# Patient Record
Sex: FEMALE | Race: WHITE
[De-identification: names, ages, dates, MRNs, and addresses within clinical notes are randomized per-mention and may not be internally consistent; named-entity substitution may affect disease eponyms.]

---

## 2017-04-15 ENCOUNTER — HOSPITAL ENCOUNTER (EMERGENCY)
Dept: HOSPITAL 25 - ED | Age: 62
Discharge: HOME | End: 2017-04-15
Payer: COMMERCIAL

## 2017-04-15 VITALS — DIASTOLIC BLOOD PRESSURE: 57 MMHG | SYSTOLIC BLOOD PRESSURE: 102 MMHG

## 2017-04-15 DIAGNOSIS — S92.911A: Primary | ICD-10-CM
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
## 2017-04-15 PROCEDURE — 99282 EMERGENCY DEPT VISIT SF MDM: CPT

## 2017-04-15 NOTE — ED
Lower Extremity





- HPI Summary


HPI Summary: 


Patient was at home walking around in socks when she accidentally kicked a 

heating grate in her house with her right small toe. She had immediate pain and 

presents worried she has broken her small toe. She has not taken any pain 

medication and has not previously injured this toe.





- History of Current Complaint


Chief Complaint: EDExtremityLower


Stated Complaint: HURT TOE RIGHT FOOT


Time Seen by Provider: 04/15/17 17:30


Hx Obtained From: Patient


Mechanism Of Injury: Blunt Trauma


Onset of Pain: Immediate


Onset/Duration: Hours


Severity Initially: Mild


Severity Currently: Mild


Pain Intensity: 3


Timing: Constant


Location: Is Discrete @ - right small toe


Character Of Pain: Sharp, Aching


Associated Signs And Symptoms: Positive: Swelling


Aggravating Factor(s): Movement


Alleviating Factor(s): Nothing


Able to Bear Weight: Yes





- Allergies/Home Medications


Allergies/Adverse Reactions: 


 Allergies











Allergy/AdvReac Type Severity Reaction Status Date / Time


 


Nefazodone [From Serzone] Allergy  Headache Verified 04/07/17 11:05


 


Prochlorperazine Allergy  See Comment Verified 04/07/17 11:05





[From Compazine]     


 


Sulfamethoxazole Allergy  Rash Verified 04/07/17 11:05





w/Trimethoprim     





[From Septra]     


 


nefrazodone Allergy Unknown Unknown Uncoded 04/07/17 11:05





   Reaction  





   Details  














PMH/Surg Hx/FS Hx/Imm Hx


Endocrine/Hematology History: Reports: Hx Thyroid Disease


   Denies: Hx Anticoagulant Therapy, Hx Diabetes


Cardiovascular History: 


   Denies: Hx Hypertension, Hx Pacemaker/ICD


Respiratory History: 


   Denies: Hx Asthma, Hx Chronic Obstructive Pulmonary Disease (COPD)


 History: 


   Denies: Hx Renal Disease


Musculoskeletal History: 


   Denies: Hx Osteoporosis


Sensory History: Reports: Hx Contacts or Glasses


   Denies: Hx Hearing Aid


Opthamlomology History: Reports: Hx Contacts or Glasses


Neurological History: Reports: Hx Migraine, Other Neuro Impairments/Disorders - 

Hx one episode transient global amnesia


   Denies: Hx Dementia, Hx Seizures


Psychiatric History: Reports: Hx Depression


   Denies: Hx Panic Disorder, Hx Substance Abuse





- Cancer History


Cancer Type, Location and Year: SKIN


Hx Chemotherapy: No


Hx Radiation Therapy: No





- Surgical History


Surgery Procedure, Year, and Place: TONSILS, PARTIAL THYROID REMOVAL, DENTAL 

IMPLANT





- Immunization History


Date of Tetanus Vaccine: Unk


Date of Influenza Vaccine: Unk


Infectious Disease History: No


Infectious Disease History: 


   Denies: Hx Hepatitis, Hx Human Immunodeficiency Virus (HIV), Traveled 

Outside the [place] in Last 30 Days





- Family History


Known Family History: Positive: None





- Social History


Occupation: Retired


Lives: With Family


Alcohol Use: None


Substance Use Type: Reports: None


Smoking Status (MU): Never Smoked Tobacco





Review of Systems


Positive: Myalgia, Edema - mild right small toe


Negative: Paresthesia, Numbness


All Other Systems Reviewed And Are Negative: Yes





Physical Exam


Triage Information Reviewed: Yes


Vital Signs On Initial Exam: 


 Initial Vitals











Temp Pulse Resp BP Pulse Ox


 


 98.5 F   69   15   106/65   100 


 


 04/15/17 17:24  04/15/17 17:24  04/15/17 17:24  04/15/17 17:24  04/15/17 17:24











Vital Signs Reviewed: Yes


Appearance: Positive: Well-Appearing, Well-Nourished, Pain Distress


Skin: Positive: Warm, Skin Color Reflects Adequate Perfusion, Dry, Soft


Head/Face: Positive: Normal Head/Face Inspection


Eyes: Positive: EOMI, BOONE, Conjunctiva Clear


ENT: Positive: Hearing grossly normal


Respiratory/Lung Sounds: Positive: Breath Sounds Present


Cardiovascular: Positive: RRR


Musculoskeletal: Positive: Limited @ - movement of right small toe is painful, 

Pain @ - right small toe is TTP, Edema Right - mild small toe


Neurological: Positive: Sensory/Motor Intact, Alert, Oriented to Person Place, 

Time, NV Bundle Intact Distally, Abnormal Gait - mild limp


Psychiatric: Positive: Affect/Mood Appropriate


AVPU Assessment: Alert





Diagnostics





- Vital Signs


 Vital Signs











  Temp Pulse Resp BP Pulse Ox


 


 04/15/17 17:24  98.5 F  69  15  106/65  100














- Laboratory


Lab Statement: Any lab studies that have been ordered have been reviewed, and 

results considered in the medical decision making process.





- Radiology


  ** No standard instances


Xray Interpretation: Positive (See Comments)


Radiology Interpretation Completed By: ED Physician - right 5th digit proximal 

phalanx fracture with minimal displacement





Lower Extremity Course/Dx





- Diagnoses


Differential Diagnosis/HQI/PQRI: Positive: Arthritis, Bursitis, Contusion, 

Fracture (Closed), Phlebitis, Sprain, Strain


Provider Diagnoses: 


 Fracture of fifth toe, right, closed








Discharge





- Discharge Plan


Condition: Stable


Disposition: HOME


Patient Education Materials:  Toe Fracture (ED)


Referrals: 


Larry Cespedes MD [Primary Care Provider] - 


Giovanny Islas MD [Medical Doctor] - 


Additional Instructions: 


Please keep your toe chloe taped and wear the shoe provided to keep your toe 

from flexing. Elevate your foot and use ibuprofen 400-600mg three times daily 

with meals for the next 3-5 days to decrease swelling and pain. Call Dr. Islas

's office Monday for an appointment with orthopedics for follow-up evaluation.

## 2017-04-15 NOTE — RAD
HISTORY: Right fifth toe trauma



COMPARISONS: None



VIEWS: 3, Frontal, lateral, and oblique views of the fifth digit of the right foot



FINDINGS:



BONE DENSITY: Normal.

BONES: There is an oblique nondisplaced fracture of the proximal phalanx of the fifth

digit    

JOINTS: There is no arthropathy.    

ALIGNMENT: There is no dislocation. 

SOFT TISSUES: Unremarkable.



OTHER FINDINGS: None.



IMPRESSION: 

OBLIQUE FRACTURE OF THE PROXIMAL PHALANX OF THE FIFTH DIGIT OF THE RIGHT FOOT

## 2018-10-19 ENCOUNTER — HOSPITAL ENCOUNTER (EMERGENCY)
Dept: HOSPITAL 25 - ED | Age: 63
Discharge: HOME | End: 2018-10-19
Payer: MEDICARE

## 2018-10-19 VITALS — SYSTOLIC BLOOD PRESSURE: 135 MMHG | DIASTOLIC BLOOD PRESSURE: 86 MMHG
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
## 2018-10-19 DIAGNOSIS — S09.90XA: ICD-10-CM

## 2018-10-19 DIAGNOSIS — S01.01XA: Primary | ICD-10-CM

## 2018-10-19 DIAGNOSIS — E07.9: ICD-10-CM
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
## 2018-10-19 PROCEDURE — 70450 CT HEAD/BRAIN W/O DYE: CPT

## 2018-10-19 PROCEDURE — 12001 RPR S/N/AX/GEN/TRNK 2.5CM/<: CPT

## 2018-10-19 PROCEDURE — 99282 EMERGENCY DEPT VISIT SF MDM: CPT

## 2018-10-19 NOTE — RAD
EXAM:

  CT Head Without Intravenous Contrast



CLINICAL HISTORY:

  63 years old, female; Injury or trauma; Fall; Injury details: Posterior head 

laceration; Additional info: Head injury



TECHNIQUE:

  Axial computed tomography images of the head/brain without intravenous 

contrast.  All CT scans at this facility use at least one of these dose 

optimization techniques: automated exposure control; mA and/or kV adjustment 

per patient size (includes targeted exams where dose is matched to clinical 

indication); or iterative reconstruction.



COMPARISON:

  BRAIN WO CT BRAIN WO 8/20/2013 8:06 PM



FINDINGS:

  Brain:  No intracranial hemorrhage or extra-axial fluid collection. No 

evidence of mass effect or midline shift. Gray-white matter differentiation is 

normal.

  Ventricles:  Ventricles and sulci are normal.

  Bones/joints:  No acute osseus lesions or fractures.

  Soft tissues:  Unremarkable.

  Sinuses:  Unremarkable as visualized.  No acute sinusitis.

  Mastoid air cells:  Mastoid air cells are clear.



IMPRESSION:     

  No acute intracranial pathology.



To contact [facility] with a general question: [facility] - [phone]

For direct physician to physician contact: Physician Hotline - [phone]

[facility] ([facility] Facility ID #[id number])

## 2018-10-19 NOTE — ED
Head Injury





- HPI Summary


HPI Summary: 


63-year-old female presents with head injury today.  She fell backwards and 

struck her head on the concrete.  She denies any loss consciousness.  She 

admits to nausea but denies any vomiting.  She denies any dizziness.  She 

denies any change in vision.  The laceration of her scalp continues to bleed.  

She believes her tetanus up-to-date.  States she is on the medication that may 

thin her blood.  no neck pain. no other injury. 








- History Of Current Complaint


Chief Complaint: EDHeadInjury


Stated Complaint: FALL/LAC


Time Seen by Provider: 10/19/18 17:56


Pain Intensity: 4





- Allergies/Home Medications


Allergies/Adverse Reactions: 


 Allergies











Allergy/AdvReac Type Severity Reaction Status Date / Time


 


epinephrine Allergy  Nausea Verified 10/19/18 18:09


 


nefazodone [From Serzone] Allergy  Headache Verified 10/19/18 18:09


 


prochlorperazine Allergy  See Comment Verified 10/19/18 18:09





[From Compazine]     


 


sulfamethoxazole Allergy  Rash Verified 10/19/18 18:09





[From Septra]     


 


trimethoprim [From Septra] Allergy  Rash Verified 10/19/18 18:09


 


nefrazodone Allergy Unknown Unknown Uncoded 11/06/17 10:20





   Reaction  





   Details  














PMH/Surg Hx/FS Hx/Imm Hx


Endocrine/Hematology History: Reports: Hx Thyroid Disease


   Denies: Hx Anticoagulant Therapy, Hx Diabetes


Cardiovascular History: 


   Denies: Hx Hypertension, Hx Pacemaker/ICD


Respiratory History: 


   Denies: Hx Asthma, Hx Chronic Obstructive Pulmonary Disease (COPD)


 History: 


   Denies: Hx Renal Disease


Musculoskeletal History: 


   Denies: Hx Osteoporosis


Sensory History: Reports: Hx Contacts or Glasses


   Denies: Hx Hearing Aid


Opthamlomology History: Reports: Hx Contacts or Glasses


Neurological History: Reports: Hx Migraine, Other Neuro Impairments/Disorders - 

Hx one episode transient global amnesia


   Denies: Hx Dementia, Hx Seizures


Psychiatric History: Reports: Hx Depression


   Denies: Hx Panic Disorder, Hx Substance Abuse





- Cancer History


Cancer Type, Location and Year: SKIN


Hx Chemotherapy: No


Hx Radiation Therapy: No





- Surgical History


Surgery Procedure, Year, and Place: THYROID RIGHT NODULE REMOVED 1991.  DENTAL 

IMPLANT 2015 TOOTH POST





- Immunization History


Date of Tetanus Vaccine: Unk


Date of Influenza Vaccine: Unk


Infectious Disease History: No


Infectious Disease History: 


   Denies: Hx Hepatitis, Hx Human Immunodeficiency Virus (HIV), Traveled 

Outside the [place] in Last 30 Days





- Family History


Known Family History: Positive: None





- Social History


Alcohol Use: None


Substance Use Type: Reports: None


Smoking Status (MU): Never Smoked Tobacco





Review of Systems


Negative: Fever


Negative: Chest Pain


Negative: Shortness Of Breath


Positive: Other - laceration scalp


Positive: Headache


All Other Systems Reviewed And Are Negative: Yes





Physical Exam


Triage Information Reviewed: Yes


Vital Signs On Initial Exam: 


 Initial Vitals











Temp Pulse Resp BP Pulse Ox


 


 100.8 F   88   16   136/78   99 


 


 10/19/18 17:49  10/19/18 17:49  10/19/18 17:49  10/19/18 17:49  10/19/18 17:49











Vital Signs Reviewed: Yes


Appearance: Positive: Well-Appearing


Skin: Positive: Warm, Dry, Other - 2cm by 1cm laceration on scalp


Head/Face: Positive: Normal Head/Face Inspection, Other - no step off, racoon 

eyes, moses sign


Eyes: Positive: Normal, EOMI, BOONE, Conjunctiva Clear


ENT: Positive: Normal ENT inspection, Pharynx normal


Neck: Positive: Other: - nontender neck


Respiratory/Lung Sounds: Positive: Clear to Auscultation, Breath Sounds Present


Cardiovascular: Positive: Normal, RRR


Musculoskeletal: Positive: Normal


Neurological: Positive: Normal


Psychiatric: Positive: Normal





Procedures





- Laceration/Wound Repair


  ** 1


Location: Other - scalp


Description: Linear


Anesthesia: Local, 1.0%


Length, Depth and Shape: 2cm by 1cm laceration


Irrigated w/ Saline (ccs): 100


Closure: Staples #__ - 2





Diagnostics





- Vital Signs


 Vital Signs











  Temp Pulse Resp BP Pulse Ox


 


 10/19/18 17:49  100.8 F  88  16  136/78  99














- Laboratory


Lab Statement: Any lab studies that have been ordered have been reviewed, and 

results considered in the medical decision making process.





- CT


  ** brain


CT Interpretation: No Acute Changes


CT Interpretation Completed By: Radiologist





Head Injury Course/Dx


Course Of Treatment: 63-year-old female presents with head injury today.  She 

fell backwards and struck her head on the concrete.  She denies any loss 

consciousness.  She admits to nausea but denies any vomiting.  She denies any 

dizziness.  She denies any change in vision.  The laceration of her scalp 

continues to bleed.  She believes her tetanus up-to-date.  States she is on the 

medication that may thin her blood.  no neck pain. no other injury. On exam 

normal neuro exam.  Has a 2 cm x 1 center laceration to scalp.  cleaned area 

and placed 2 staples.  CT brain normal.  Told to follow-up with primary.  

Patient understands agrees with plan.





- Diagnoses


Differential Diagnosis/HQI/PQRI: Concussion Without LOC, Contusion, 

Intracranial Bleed


Provider Diagnoses: 


 Head injury, Scalp laceration








Discharge





- Sign-Out/Discharge


Documenting (check all that apply): Patient Departure





- Discharge Plan


Condition: Good


Disposition: HOME


Patient Education Materials:  Head Injury (ED), Staple Care (ED)


Referrals: 


Larry Cespedes MD [Primary Care Provider] - 


Additional Instructions: 


Take Tylenol or ibuprofen for pain every 6 hours


Do not scrub staple area


Return to ED, urgent care or primary in 7 days to have staples removed


Follow up with primary within 5 days


Return to ED if develop signs of infection such as fever, spreading redness, or 

pus or any new or worsening symptoms





- Billing Disposition and Condition


Condition: GOOD


Disposition: Home